# Patient Record
Sex: MALE | Race: BLACK OR AFRICAN AMERICAN | NOT HISPANIC OR LATINO | URBAN - METROPOLITAN AREA
[De-identification: names, ages, dates, MRNs, and addresses within clinical notes are randomized per-mention and may not be internally consistent; named-entity substitution may affect disease eponyms.]

---

## 2019-01-12 ENCOUNTER — EMERGENCY (EMERGENCY)
Facility: HOSPITAL | Age: 24
LOS: 0 days | Discharge: HOME | End: 2019-01-13
Attending: EMERGENCY MEDICINE
Payer: SELF-PAY

## 2019-01-12 VITALS
HEART RATE: 68 BPM | HEIGHT: 70 IN | RESPIRATION RATE: 20 BRPM | OXYGEN SATURATION: 100 % | DIASTOLIC BLOOD PRESSURE: 77 MMHG | WEIGHT: 160.06 LBS | SYSTOLIC BLOOD PRESSURE: 134 MMHG

## 2019-01-12 DIAGNOSIS — F43.20 ADJUSTMENT DISORDER, UNSPECIFIED: ICD-10-CM

## 2019-01-12 DIAGNOSIS — R46.2 STRANGE AND INEXPLICABLE BEHAVIOR: ICD-10-CM

## 2019-01-12 DIAGNOSIS — F12.929 CANNABIS USE, UNSPECIFIED WITH INTOXICATION, UNSPECIFIED: ICD-10-CM

## 2019-01-12 DIAGNOSIS — F94.0 SELECTIVE MUTISM: ICD-10-CM

## 2019-01-12 LAB
ALBUMIN SERPL ELPH-MCNC: 4.3 G/DL — SIGNIFICANT CHANGE UP (ref 3.3–5)
ALP SERPL-CCNC: 67 U/L — SIGNIFICANT CHANGE UP (ref 40–120)
ALT FLD-CCNC: 36 U/L — SIGNIFICANT CHANGE UP (ref 12–78)
AMMONIA BLD-MCNC: 19 UMOL/L — SIGNIFICANT CHANGE UP (ref 11–32)
ANION GAP SERPL CALC-SCNC: 9 MMOL/L — SIGNIFICANT CHANGE UP (ref 5–17)
APAP SERPL-MCNC: < 2 UG/ML (ref 10–30)
APPEARANCE UR: CLEAR — SIGNIFICANT CHANGE UP
APTT BLD: 28.3 SEC — LOW (ref 28.5–37)
AST SERPL-CCNC: 29 U/L — SIGNIFICANT CHANGE UP (ref 15–37)
BILIRUB SERPL-MCNC: 0.9 MG/DL — SIGNIFICANT CHANGE UP (ref 0.2–1.2)
BILIRUB UR-MCNC: NEGATIVE — SIGNIFICANT CHANGE UP
BUN SERPL-MCNC: 12 MG/DL — SIGNIFICANT CHANGE UP (ref 7–23)
CALCIUM SERPL-MCNC: 8.8 MG/DL — SIGNIFICANT CHANGE UP (ref 8.5–10.1)
CHLORIDE SERPL-SCNC: 109 MMOL/L — HIGH (ref 96–108)
CO2 SERPL-SCNC: 25 MMOL/L — SIGNIFICANT CHANGE UP (ref 22–31)
COLOR SPEC: YELLOW — SIGNIFICANT CHANGE UP
CREAT SERPL-MCNC: 1.22 MG/DL — SIGNIFICANT CHANGE UP (ref 0.5–1.3)
DIFF PNL FLD: NEGATIVE — SIGNIFICANT CHANGE UP
ETHANOL SERPL-MCNC: <10 MG/DL — SIGNIFICANT CHANGE UP (ref 0–10)
GLUCOSE BLDC GLUCOMTR-MCNC: 80 MG/DL — SIGNIFICANT CHANGE UP (ref 70–99)
GLUCOSE BLDC GLUCOMTR-MCNC: 87 MG/DL — SIGNIFICANT CHANGE UP (ref 70–99)
GLUCOSE SERPL-MCNC: 87 MG/DL — SIGNIFICANT CHANGE UP (ref 70–99)
GLUCOSE UR QL: NEGATIVE MG/DL — SIGNIFICANT CHANGE UP
HCT VFR BLD CALC: 41.6 % — SIGNIFICANT CHANGE UP (ref 39–50)
HGB BLD-MCNC: 14.2 G/DL — SIGNIFICANT CHANGE UP (ref 13–17)
INR BLD: 1.08 RATIO — SIGNIFICANT CHANGE UP (ref 0.88–1.16)
KETONES UR-MCNC: ABNORMAL
LACTATE SERPL-SCNC: 1.3 MMOL/L — SIGNIFICANT CHANGE UP (ref 0.7–2)
LEUKOCYTE ESTERASE UR-ACNC: NEGATIVE — SIGNIFICANT CHANGE UP
MCHC RBC-ENTMCNC: 28.4 PG — SIGNIFICANT CHANGE UP (ref 27–34)
MCHC RBC-ENTMCNC: 34.1 GM/DL — SIGNIFICANT CHANGE UP (ref 32–36)
MCV RBC AUTO: 83.2 FL — SIGNIFICANT CHANGE UP (ref 80–100)
NITRITE UR-MCNC: NEGATIVE — SIGNIFICANT CHANGE UP
NRBC # BLD: 0 /100 WBCS — SIGNIFICANT CHANGE UP (ref 0–0)
PCP SPEC-MCNC: SIGNIFICANT CHANGE UP
PH UR: 6 — SIGNIFICANT CHANGE UP (ref 5–8)
PLATELET # BLD AUTO: 219 K/UL — SIGNIFICANT CHANGE UP (ref 150–400)
POTASSIUM SERPL-MCNC: 3.6 MMOL/L — SIGNIFICANT CHANGE UP (ref 3.5–5.3)
POTASSIUM SERPL-SCNC: 3.6 MMOL/L — SIGNIFICANT CHANGE UP (ref 3.5–5.3)
PROT SERPL-MCNC: 7.9 GM/DL — SIGNIFICANT CHANGE UP (ref 6–8.3)
PROT UR-MCNC: 15 MG/DL
PROTHROM AB SERPL-ACNC: 12.1 SEC — SIGNIFICANT CHANGE UP (ref 10–12.9)
RBC # BLD: 5 M/UL — SIGNIFICANT CHANGE UP (ref 4.2–5.8)
RBC # FLD: 13 % — SIGNIFICANT CHANGE UP (ref 10.3–14.5)
SALICYLATES SERPL-MCNC: <1.7 MG/DL — LOW (ref 2.8–20)
SODIUM SERPL-SCNC: 143 MMOL/L — SIGNIFICANT CHANGE UP (ref 135–145)
SP GR SPEC: 1.01 — SIGNIFICANT CHANGE UP (ref 1.01–1.02)
TSH SERPL-MCNC: 1.4 UIU/ML — SIGNIFICANT CHANGE UP (ref 0.36–3.74)
UROBILINOGEN FLD QL: 1 MG/DL
WBC # BLD: 5.02 K/UL — SIGNIFICANT CHANGE UP (ref 3.8–10.5)
WBC # FLD AUTO: 5.02 K/UL — SIGNIFICANT CHANGE UP (ref 3.8–10.5)

## 2019-01-12 PROCEDURE — 99283 EMERGENCY DEPT VISIT LOW MDM: CPT

## 2019-01-12 PROCEDURE — 70450 CT HEAD/BRAIN W/O DYE: CPT | Mod: 26

## 2019-01-12 PROCEDURE — 90792 PSYCH DIAG EVAL W/MED SRVCS: CPT | Mod: GT

## 2019-01-12 RX ORDER — SODIUM CHLORIDE 9 MG/ML
1000 INJECTION INTRAMUSCULAR; INTRAVENOUS; SUBCUTANEOUS ONCE
Qty: 0 | Refills: 0 | Status: COMPLETED | OUTPATIENT
Start: 2019-01-12 | End: 2019-01-12

## 2019-01-12 RX ADMIN — SODIUM CHLORIDE 1000 MILLILITER(S): 9 INJECTION INTRAMUSCULAR; INTRAVENOUS; SUBCUTANEOUS at 16:23

## 2019-01-12 NOTE — ED BEHAVIORAL HEALTH ASSESSMENT NOTE - PSYCHIATRIC ISSUES AND PLAN (INCLUDE STANDING AND PRN MEDICATION)
r/o primary psychotic vs mood d/o as first break if symptoms do not clear; ativan 2mg/haldol 5mg/benadryl prn for any acute agitation r/o primary psychotic vs mood d/o as first break if symptoms do not clear (specifically concerned for thought blocking); ativan 2mg/haldol 5mg/benadryl prn for any acute agitation

## 2019-01-12 NOTE — ED BEHAVIORAL HEALTH ASSESSMENT NOTE - PERSONAL COLLATERAL PHONE
Marcie Hi, [phone], grandmother, daily contact, most recent contact this morning ~8am, reliable, no safety concerns, felt that patient was completely fine this morning

## 2019-01-12 NOTE — ED BEHAVIORAL HEALTH ASSESSMENT NOTE - OTHER
grandmother work called EMS unable to fully assess at this time limited inappropriate eye contact, closing eyes often defer, lying in bed nods head flat, not much emotion, looks sleepy limited based on minimal verbal output, however what he does respond to makes sense denies limited at this time n/a external n/a

## 2019-01-12 NOTE — ED ADULT NURSE NOTE - CAS ELECT INFOMATION PROVIDED
discharged home, instructed to follow up with primary mdtoni verbalized understanding of instructions/DC instructions

## 2019-01-12 NOTE — ED PROVIDER NOTE - CARE PLAN
Principal Discharge DX:	Muteness Principal Discharge DX:	Muteness  Secondary Diagnosis:	Cannabis intoxication with complication

## 2019-01-12 NOTE — ED PROVIDER NOTE - PHYSICAL EXAMINATION
Vitals: WNL  Gen: AAOx3, NAD, sitting comfortably in stretcher  Head: ncat, perrla, eomi b/l  Neck: supple, no lymphadenopathy, no midline deviation  Heart: rrr, no m/r/g  Lungs: CTA b/l, no rales/ronchi/wheezes  Abd: soft, nontender, non-distended, no rebound or guarding  Ext: no clubbing/cyanosis/edema  Neuro: sensation and muscle strength intact b/l, steady gait 1

## 2019-01-12 NOTE — ED BEHAVIORAL HEALTH ASSESSMENT NOTE - SUMMARY
Patient at this time not fully able to cooperate in evaluation level that is necessary to determine risk. Appears delayed in speech 2/2 substance (Utox +cannabis- could have been laced with other substance) vs medical (seizure post-ictal, however no known past seizure disorder) vs psychiatric (acute stress reaction vs major depressive disorder with psychosis vs psychotic d/o, however also no known psych hx).   will require monitoring and re-eval if symptoms clear from suspected substance to better determine his diagnosis and/or need for admission. Patient at this time not fully able to cooperate in evaluation level that is necessary to determine risk. Appears delayed in speech 2/2 substance (Utox +cannabis- could have been laced with other substance) vs medical (seizure post-ictal, however no known past seizure disorder) vs psychiatric (acute stress reaction vs major depressive disorder with psychosis vs psychotic d/o, however also no known psych hx).   will require monitoring and re-eval if symptoms clear from suspected substance to better determine his diagnosis and/or need for admission.    update: pt seen the next day at 1215. he was more engaged, with decreased latency of speech, affect was mayorga and he was more verbal. he was without agitation  or gross thought disorder, denied si/hi, denied hallucinations, was future oriented. cognition intact. per GM he is doing well and at his baseline that she knows (she lives with him). most likely pt's sx's were substance induced. while a psychotic d/o cannot be definitively ruled out, pt is currently exhibiting no signs of acute dangerousness, does not require hospitaliations, and is appropriate for discharge and outpatient follow up.

## 2019-01-12 NOTE — ED ADULT TRIAGE NOTE - CHIEF COMPLAINT QUOTE
ems states, "pt was acting strange at work, crying, not speaking, blank stare , denies psych hx, ,denies drug use " pt not co  operative in triage

## 2019-01-12 NOTE — ED BEHAVIORAL HEALTH ASSESSMENT NOTE - NS ED BHA PLAN TR REFERRAL APPT DISCUSSED2 FT
BTCM faxed to [facility] and referrals on chem dependency and psychaitric (dual dx) treatment to follow up with as outpatient

## 2019-01-12 NOTE — ED ADULT NURSE NOTE - ADDITIONAL DETAILS / COMMENTS
[facility]                                   [address] [address]                                                      PHONE: [phone]                                                         FAX: [phone]  [email or web address]  ---------------------------------------------------------------------------------------------------------------------------------    Please see cath report.    LIMA to courtney and the stents in LAD/RCA are patent.  Occluded OM (old?).  Elevated pulmonary pressures.  Med tx for CAD (NQWMI) will get CT ro r/o PE      Topher Gomez MD    Office [phone]  Cell     [phone] pt states he was brought by the ems for evaluation.

## 2019-01-12 NOTE — ED PROVIDER NOTE - OBJECTIVE STATEMENT
22 yo M with strange behavior, bibems from work.  As per EMS, pt. was crying and not speaking at work.  Pt. refuses to elaborate in ER when questions.  Pt. simply nods and shakes yes and no to questions, non-verbal, but interactive.  No complaints.  When asked if SI/HI, pt. just stares--nonresponsive.  ROS: negative for fever, cough, headache, chest pain, shortness of breath, abd pain, nausea, vomiting, diarrhea, rash, paresthesia, and weakness--all other systems reviewed are negative.   PMH: Denies; Meds: Denies; SH: Denies smoking/drinking/drug use 24 yo M with strange behavior, bibems from work.  As per EMS, pt. was crying and not speaking at work.  Pt. refuses to elaborate in ER when questions.  Pt. simply nods and shakes yes and no to questions, non-verbal, but interactive.  No complaints.  When asked if SI/HI, pt. just stares--nonresponsive.  Discussed with grandmother who denies prior psych history, says pt. was completely normal this morning before leaving for work.  This is abnormal behavior for patient.   ROS: negative for fever, cough, headache, chest pain, shortness of breath, abd pain, nausea, vomiting, diarrhea, rash, paresthesia, and weakness--all other systems reviewed are negative.   PMH: Denies; Meds: Denies; SH: Denies smoking/drinking/drug use  Grandmother, Marcie Hi: [phone]

## 2019-01-12 NOTE — ED BEHAVIORAL HEALTH ASSESSMENT NOTE - HPI (INCLUDE ILLNESS QUALITY, SEVERITY, DURATION, TIMING, CONTEXT, MODIFYING FACTORS, ASSOCIATED SIGNS AND SYMPTOMS)
Patient on exam is delayed in speech however answers questions appropriately. Appears slowed down. States date appropriately. Knows he is in hospital but does not know what happened earlier. States his mind feels slowed, with hard to get thoughts out. Utox +cannabis but states he last used 1 week ago. Mainly noding shaking his head in a delayed way to questioning. Denies pain. Denies SI/HI. Nods no to feeling depressed or scared. Nods no to trauma or acute event at work. States he does not recall. 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     Patient on exam is delayed in speech however answers questions appropriately. Appears slowed down. States date appropriately. Knows he is in hospital but does not know what happened earlier. States his mind feels slowed, with hard to get thoughts out. Utox +cannabis but states he last used 1 week ago. Mainly noding shaking his head in a delayed way to questioning. Denies pain. Denies SI/HI. Nods no to feeling depressed or scared. Nods no to trauma or acute event at work. States he does not recall.    Per patient’s grandmother, Marcie Hi ([phone]) the HPI starts today. 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 Per collateral, he has no baseline symptoms and he has never been had any psychiatric treatment. Collateral reports that today, she left for work at ~8am after speaking with patient, who was talking and interacting with her normally. Collateral reports that patient appeared to be at his baseline this morning and did not notice any changes or distress. Collateral reports that patient went to work and someone at work called EMS after he was acting “strangely.” Collateral reports that she does not know what happened at work that triggered this. Collateral reports that a few weeks ago, she noticed that patient seemed a little bit more quiet than he normally does and she reports that she has been consistently asking him if he is okay and he has reported that he is “fine.” Collateral states that she also noticed that he has not been smiling as much as he usually does in the past few weeks. Collateral reports that she has not noticed any changes in patient’s behaviors, sleeping/eating/grooming habits, or his functioning. Collateral reports that patient has been attending work every day. Collateral reports that she cannot identify any triggers or stressors in patient’s life, but reports that patient moved to [place] in July from [place] and his mother passed away 5 years ago. Collateral reports that patient has no medical, psychiatric or substance use history and denies family hx of medical, psychiatric and substance issues. 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     Patient on exam is delayed in speech however answers questions appropriately. Appears slowed down. States date appropriately. Knows he is in hospital but does not know what happened earlier. States his mind feels slowed, with hard to get thoughts out. Utox +cannabis but states he last used 1 week ago. Mainly noding shaking his head in a delayed way to questioning. Denies pain. Denies SI/HI. Nods no to feeling depressed or scared. Nods no to trauma or acute event at work. States he does not recall.    Patient seen again about 4 hours later by same writer. Patient still with some delayed speech at times but engaging more in conversation. States he still does not remember incident from earlier today or why EMS was called. States he slept well all week, denies feeling depressed, and does not hear voices or have racing thoughts. Denies feeling paranoid. States he made it through a few college courses but has been working at [employer]. States the only time he talked to a counselor was in college over stress of grades. Denies ever talking to a psychiatrist or being on medication. Continues to deny pain but does say he feels a bit confused still. Immediate and delayed recall 3/3. Has trouble with attention, getting what appears to be stuck on months of year backwards with long pauses in between every few months. States again he does not feel suicidal or homicidal. Denies feeling unsafe.    Per patient’s grandmother, Marcie Hi ([phone]) the HPI starts today. 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 Per collateral, he has no baseline symptoms and he has never been had any psychiatric treatment. Collateral reports that today, she left for work at ~8am after speaking with patient, who was talking and interacting with her normally. Collateral reports that patient appeared to be at his baseline this morning and did not notice any changes or distress. Collateral reports that patient went to work and someone at work called EMS after he was acting “strangely.” Collateral reports that she does not know what happened at work that triggered this. Collateral reports that a few weeks ago, she noticed that patient seemed a little bit more quiet than he normally does and she reports that she has been consistently asking him if he is okay and he has reported that he is “fine.” Collateral states that she also noticed that he has not been smiling as much as he usually does in the past few weeks. Collateral reports that she has not noticed any changes in patient’s behaviors, sleeping/eating/grooming habits, or his functioning. Collateral reports that patient has been attending work every day. Collateral reports that she cannot identify any triggers or stressors in patient’s life, but reports that patient moved to [place] in July from [place] and his mother passed away 5 years ago. Collateral reports that patient has no medical, psychiatric or substance use history and denies family hx of medical, psychiatric and substance issues. 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   Patient on exam is delayed in speech however answers questions appropriately. Appears slowed down. States date appropriately. Knows he is in hospital but does not know what happened earlier. States his mind feels slowed, with hard to get thoughts out. Utox +cannabis but states he last used 1 week ago. Mainly noding shaking his head in a delayed way to questioning. Denies pain. Denies SI/HI. Nods no to feeling depressed or scared. Nods no to trauma or acute event at work. States he does not recall.  Patient seen again about 4 hours later by same writer. Patient still with some delayed speech at times but engaging more in conversation. States he still does not remember incident from earlier today or why EMS was called. States he slept well all week, denies feeling depressed, and does not hear voices or have racing thoughts. Denies feeling paranoid. States he made it through a few college courses but has been working at [employer]. States the only time he talked to a counselor was in college over stress of grades. Denies ever talking to a psychiatrist or being on medication. Continues to deny pain but does say he feels a bit confused still. Immediate and delayed recall 3/3. Has trouble with attention, getting what appears to be stuck on months of year backwards with long pauses in between every few months. States again he does not feel suicidal or homicidal. Denies feeling unsafe.  Per patient’s grandmother, Marcie Hi ([phone]) the HPI starts today. 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 Per collateral, he has no baseline symptoms and he has never been had any psychiatric treatment. Collateral reports that today, she left for work at ~8am after speaking with patient, who was talking and interacting with her normally. Collateral reports that patient appeared to be at his baseline this morning and did not notice any changes or distress. Collateral reports that patient went to work and someone at work called EMS after he was acting “strangely.” Collateral reports that she does not know what happened at work that triggered this. Collateral reports that a few weeks ago, she noticed that patient seemed a little bit more quiet than he normally does and she reports that she has been consistently asking him if he is okay and he has reported that he is “fine.” Collateral states that she also noticed that he has not been smiling as much as he usually does in the past few weeks. Collateral reports that she has not noticed any changes in patient’s behaviors, sleeping/eating/grooming habits, or his functioning. Collateral reports that patient has been attending work every day. Collateral reports that she cannot identify any triggers or stressors in patient’s life, but reports that patient moved to [place] in July from [place] and his mother passed away 5 years ago. Collateral reports that patient has no medical, psychiatric or substance use history and denies family hx of medical, psychiatric and substance issues.  update: pt reassessed at 1215 on 1/13. he was calm, cooperative, aaox4, while he remained slightly slowed in his speech, he was linear, not disorganized, no agitation, no delusions elicited, denied si/hi, denied hallucinations, is future oriented. he states he uses marijuna every other day , and while his last use was a week ago, he feels that this "personal habit" of his likely is causing how he felt yesterday, he wishes to address/work on his marijuana use; he also states he needs to have better eating habits, and sleep more as well. he denies sx's of depression or anxiety, he feels that he just needs to sleep and eat more, and cut down on cannabis. he remembers being brought to hospital, and cannot really account for coworkers thinking he was acting strange at [facility]. he is future oriented  and willing to f/u with outpatient care.  Per Grandmother who is now at bedside visiting pt in ED, pt is “himself” and acting at baseline. Grandmother states pt is making eye contact with her and engaging in conversation. She reports that she does not observe any differences in pt’s behavior or presentation.  Per Nurse, pt ate this morning and has presented as more oriented and able to respond to questions. Pt has remained calm and cooperative, with no agitation.

## 2019-01-12 NOTE — ED PROVIDER NOTE - MEDICAL DECISION MAKING DETAILS
24 yo M with questionable new psych vs intox, pt. is non-verbal, not answering questions  -basic labs, etoh, ammonia, ua, cx, drug screen, finger stick, lactate, tsh, salicylate/tylenol, ct brain, ekg, ns hydration  -f/u results, if negative medical w/u, psych eval

## 2019-01-12 NOTE — ED BEHAVIORAL HEALTH ASSESSMENT NOTE - REASON
monitoring to see if symptoms change/improve with team as suspect substance related reaction monitoring to see if symptoms change/improve with team as suspect substance related reaction; grandmother states she will come early in the morning to check on patient to report if he looks at his baseline or if she notices a change from when she last saw him (collateral thus far does not believe to be any changes in patient)

## 2019-01-12 NOTE — ED BEHAVIORAL HEALTH ASSESSMENT NOTE - RISK ASSESSMENT
not able to fully determine risk at this time; will continue to evaluate; protective factors- no known underlying psych d/o or suicide attempt reported by grandmother low acute risk - no agitation/hallucinations/gross disorganization; no susana; denies si/hi  cannabis abuse is a risk factor  protective factors- no known underlying psych d/o or suicide attempt reported by grandmother

## 2019-01-12 NOTE — ED BEHAVIORAL HEALTH ASSESSMENT NOTE - DETAILS
"confused" sent in by work, however grandmother is aware of Emergency Department visit aware of hold for reassess

## 2019-01-12 NOTE — ED PROVIDER NOTE - PROGRESS NOTE DETAILS
Results reported to patient--grossly benign, labs wnl with exception of positive THC in urine, CT normal  Pt. reports feeling better after stay in ER, now speaking, still cannot say what happened--presumably THC intox as cause of symptoms  pt. agrees to f/u with primary care outpt., will refer to outpt. psych services via telepsych fax  pt. understands to return to ED if symptoms worsen, parents at bedside also aware of plan of care; will d/c

## 2019-01-12 NOTE — ED ADULT NURSE NOTE - NSIMPLEMENTINTERV_GEN_ALL_ED
Implemented All Universal Safety Interventions:  [place] to call system. Call bell, personal items and telephone within reach. Instruct patient to call for assistance. Room bathroom lighting operational. Non-slip footwear when patient is off stretcher. Physically safe environment: no spills, clutter or unnecessary equipment. Stretcher in lowest position, wheels locked, appropriate side rails in place.

## 2019-01-12 NOTE — ED BEHAVIORAL HEALTH ASSESSMENT NOTE - DESCRIPTION
denies lives with grandmother, moved from [place] this past summer Patient was BIB EMS, called by work ([employer]) after patient was acting strange. EMS report states that [employer] manger called 911 because patient was “not acting normal.” Per EMS, on scene patient was alert but slow to respond, denies drug or alcohol use, denies SI and AH and was transported to ED without incident. Patient was BIB EMS presenting with bizarre behavior. Per LALIT Cruz who started working with patient at ~19:00, patient has been cooperative and calm. RN reports that previous RN stated that patient was cooperative on arrival but was not speaking and was staring at the ceiling. LALIT Cruz reports that patient has continued to just stare at the ceiling. RN reports that patient provided routine labs and urine (utox positive for THC), and allowed gowning without incident. RN reports that patient has not had any behavioral issues. Per sunrise, patient received 1L of saline IV. Per ED staff, on arrival patient was not speaking, but at ~19:00 during telepsychiatry evaluation, patient was minimally verbal. RN reports that after telepsychiatry evaluation, patient continued to stare at the ceiling. RN reports that patient is unaccompanied by social or family supports.

## 2019-01-12 NOTE — ED BEHAVIORAL HEALTH ASSESSMENT NOTE - PERSONAL COLLATERAL NAME
Marcie Hi, [phone], grandmother, daily contact, most recent contact this morning ~8am, reliable, no safety concerns, felt that patient was completely fine this morning

## 2019-01-12 NOTE — ED BEHAVIORAL HEALTH ASSESSMENT NOTE - SUBSTANCE ISSUES AND PLAN (INCLUDE STANDING AND PRN MEDICATION)
Utox+cannabis; possible patient used substance that is causing current mental states Utox+cannabis; possible patient used substance that is causing current mental states; continue to monitor to see if patient's MSE improves

## 2019-01-13 VITALS
RESPIRATION RATE: 18 BRPM | HEART RATE: 90 BPM | DIASTOLIC BLOOD PRESSURE: 56 MMHG | SYSTOLIC BLOOD PRESSURE: 129 MMHG | OXYGEN SATURATION: 97 % | TEMPERATURE: 99 F

## 2019-01-13 DIAGNOSIS — F12.10 CANNABIS ABUSE, UNCOMPLICATED: ICD-10-CM

## 2019-01-13 LAB
CULTURE RESULTS: NO GROWTH — SIGNIFICANT CHANGE UP
SPECIMEN SOURCE: SIGNIFICANT CHANGE UP

## 2019-01-13 NOTE — ED ADULT NURSE REASSESSMENT NOTE - NS ED NURSE REASSESS COMMENT FT1
Pt received from LALIT Jimenez at 1120, pt stable, kept on constant observation for safety, pt calm, no aggressive behavior noted at this current time.
Report given to oncoming RN Nancy, pts condition, reason for admission and treatment plan discussed. Provided pt history, IV site access and review of diagnostic tests performed. Pt baseline mental status on arrival, handed off in stable condition. No distress or discomfort noted at time of discharge.
Pt awake and talking responding to questions. Constant observation continues.

## 2023-03-14 NOTE — ED ADULT NURSE NOTE - NS ED BHA BENZODIAZEPINES
None known Valtrex Pregnancy And Lactation Text: this medication is Pregnancy Category B and is considered safe during pregnancy. This medication is not directly found in breast milk but it's metabolite acyclovir is present.